# Patient Record
Sex: FEMALE | Race: OTHER | HISPANIC OR LATINO | URBAN - METROPOLITAN AREA
[De-identification: names, ages, dates, MRNs, and addresses within clinical notes are randomized per-mention and may not be internally consistent; named-entity substitution may affect disease eponyms.]

---

## 2023-02-06 ENCOUNTER — EMERGENCY (EMERGENCY)
Facility: HOSPITAL | Age: 38
LOS: 0 days | Discharge: HOME | End: 2023-02-06
Attending: EMERGENCY MEDICINE
Payer: MEDICAID

## 2023-02-06 VITALS
RESPIRATION RATE: 18 BRPM | OXYGEN SATURATION: 99 % | DIASTOLIC BLOOD PRESSURE: 84 MMHG | SYSTOLIC BLOOD PRESSURE: 132 MMHG | TEMPERATURE: 97 F | WEIGHT: 149.91 LBS | HEART RATE: 98 BPM

## 2023-02-06 DIAGNOSIS — R09.81 NASAL CONGESTION: ICD-10-CM

## 2023-02-06 DIAGNOSIS — R50.9 FEVER, UNSPECIFIED: ICD-10-CM

## 2023-02-06 DIAGNOSIS — R51.9 HEADACHE, UNSPECIFIED: ICD-10-CM

## 2023-02-06 DIAGNOSIS — J32.9 CHRONIC SINUSITIS, UNSPECIFIED: ICD-10-CM

## 2023-02-06 PROCEDURE — 99283 EMERGENCY DEPT VISIT LOW MDM: CPT

## 2023-02-06 PROCEDURE — 99284 EMERGENCY DEPT VISIT MOD MDM: CPT

## 2023-02-06 RX ORDER — AZITHROMYCIN 500 MG/1
1 TABLET, FILM COATED ORAL
Qty: 1 | Refills: 0
Start: 2023-02-06 | End: 2023-02-10

## 2023-02-06 NOTE — ED PROVIDER NOTE - WET READ LAUNCH FT
.It is important to get enough sleep (at least 7 hrs a night).   Increase EXERCISE to 30 -40 minutes daily with sweating, eat a healthy diet (5 fruits and/or vegetables a day) decrease carbohydrates, stay hydrated,  take a multivitamin, take fish/krill oil There are no Wet Read(s) to document.

## 2023-02-06 NOTE — ED PROVIDER NOTE - OBJECTIVE STATEMENT
2-week history of nasal congestion facial pain and intermittent fevers.  A month ago patient had some URI symptoms.  Has not improved since then.  She has been using ibuprofen and nasal spray without any improvement.

## 2023-02-06 NOTE — ED PROVIDER NOTE - PHYSICAL EXAMINATION
Physical Exam    Vital Signs: I have reviewed the initial vital signs.  Constitutional: well-nourished, appears stated age, no acute distress  Eyes: Conjunctiva pink, Sclera clear, PERRLA, EOMI.  Nose: + sinus tenderness to maxillary area   Cardiovascular: S1 and S2, regular rate, regular rhythm, well-perfused extremities, radial pulses equal and 2+  Respiratory: unlabored respiratory effort, clear to auscultation bilaterally no wheezing, rales and rhonchi  Gastrointestinal: soft, non-tender abdomen, no pulsatile mass, normal bowl sounds  Musculoskeletal: supple neck, no lower extremity edema, no midline tenderness  Integumentary: warm, dry, no rash  Neurologic: awake, alert, cranial nerves II-XII grossly intact, extremities’ motor and sensory functions grossly intact  Psychiatric: appropriate mood, appropriate affect

## 2023-02-06 NOTE — ED PROVIDER NOTE - ATTENDING APP SHARED VISIT CONTRIBUTION OF CARE
2-week history of nasal congestion facial pain and intermittent fevers.  A month ago patient had some URI symptoms.  Has not improved since then.  She has been using ibuprofen and nasal spray without any improvement. Exam shows oropharynx is normal lungs are clear bilateral nasal swelling with turbinates enlarged.  There is some facial tenderness over the sinuses.  Plan will be to treat a sinusitis given prolonged symptoms without any improvement with conservative treatment.

## 2023-02-06 NOTE — ED PROVIDER NOTE - NSFOLLOWUPCLINICS_GEN_ALL_ED_FT
[facility]  Medicine  [address], [place]   Phone: [phone]  Fax:   Follow Up Time: 1-3 Days

## 2023-02-06 NOTE — ED PROVIDER NOTE - NS ED ATTENDING STATEMENT MOD
This was a shared visit with the KEESHA. I reviewed and verified the documentation and independently performed the documented:

## 2023-02-06 NOTE — ED PROVIDER NOTE - NSFOLLOWUPINSTRUCTIONS_ED_ALL_ED_FT
Seguimiento con christian médico de atención primaria en 1-2 días    Sinusitis    LO QUE NECESITAS SABER:    La sinusitis es la inflamación o infección de los senos paranasales. La mayoría de las veces es causada por un virus. La sinusitis aguda puede durar hasta 12 semanas. La sinusitis crónica dura más de 12 semanas. La sinusitis recurrente significa que tiene 4 o más veces en 1 año. senos paranasales         INSTRUCCIONES DE DESCARGA:    Regrese al departamento de emergencias si:    Christian richie y párpado están rojos, hinchados y dolorosos.      No puedes abrir el richie.      Tiene cambios en la visión, fanny visión doble.      Christian globo ocular sobresale o no puede  el richie.      Tiene más sueño de lo normal o nota cambios en christian capacidad para pensar, moverse o hablar.      Tiene rigidez en el keaton, fiebre o un radha dolor de jessica.      Tiene hinchazón en la frente o el cuero cabelludo.    Comuníquese con christian proveedor de atención médica si:    Gala síntomas no mejoran después de 3 días.      Gala síntomas no desaparecen después de 10 días.      Tiene náuseas y está vomitando.      Tu nariz está sangrando.      Tiene preguntas o inquietudes sobre christian condición o atención.    Medicamentos: Gala síntomas pueden desaparecer por sí solos. Christian proveedor de atención médica puede recomendar sheron espera vigilante de hasta 10 días antes de comenzar con los antibióticos. Es posible que necesite cualquiera de los siguientes:    El acetaminofén disminuye el dolor y la fiebre. Está disponible sin receta médica. Pregunte cuánto carolynn y con qué frecuencia. Seguir direcciones. Radha las etiquetas de todos los demás medicamentos que esté usando para gianni si también contienen acetaminofeno, o consulte a christian médico o farmacéutico. El acetaminofén puede causar daño hepático si no se lucia correctamente. No use más de 4 gramos (4000 miligramos) en total de acetaminofeno en un día.      Los ESTRADA, fanny el ibuprofeno, ayudan a disminuir la hinchazón, el dolor y la fiebre. Jessy medicamento está disponible con o sin receta médica. Los ESTRADA pueden causar sangrado estomacal o problemas renales en ciertas personas. Si lucia medicamentos anticoagulantes, siempre pregunte a christian proveedor de atención médica si los ESTRADA son seguros para usted. Siempre radha la etiqueta del medicamento y siga las instrucciones.      Los aerosoles nasales con esteroides pueden ayudar a disminuir la inflamación en la nariz y los senos paranasales.      Los descongestionantes ayudan a reducir la hinchazón y drenar la mucosidad en la nariz y los senos paranasales. Pueden ayudarlo a respirar más fácilmente.      Los antihistamínicos ayudan a secar la mucosidad de la nariz y alivian los estornudos.      Los antibióticos ayudan a tratar o prevenir sheron infección bacteriana.      Burgin christian medicamento según las indicaciones. Comuníquese con christian proveedor de atención médica si nicolasa que christian medicamento no está ayudando o si tiene efectos secundarios. Infórmele si es alérgico a algún medicamento. Mantenga sheron lista de los medicamentos, vitaminas y hierbas que lucia. Incluya las cantidades, y cuándo y por qué las lucia. Lleve la lista o los frascos de pastillas a las visitas de seguimiento. Lleve consigo christian lista de medicamentos en iglesia de sheron emergencia.    Cuidados personales:    Enjuague gala senos paranasales. Use un dispositivo de enjuague nasal para enjuagar glaa fosas nasales con sheron solución salina (agua salada) o agua destilada. No use agua del grifo. [place] ayudará a diluir la mucosidad en la nariz y enjuagará el polen y la suciedad. También ayudará a reducir la hinchazón para que pueda respirar normalmente. Pregúntele a christian proveedor de atención médica con qué frecuencia debe hacer esto.      Inhala vapor. Calienta un recipiente con agua hasta que veas vapor. Inclínese sobre el recipiente y breanne sheron irvin de campaña sobre christian jessica con sheron toalla lindsay. Respira profundamente shweta unos 20 minutos. Tenga cuidado de no acercarse demasiado al vapor o quemarse. Breanne esto 3 veces al día. También puede respirar profundamente cuando lucia sheron ducha caliente.      Duerme con la jessica elevada. Coloque sheron almohada adicional debajo de christian jessica antes de irse a dormir para ayudar a que gala senos paranasales se drenen.      Anna líquidos según las indicaciones. Pregúntele a christian proveedor de atención médica cuánto líquido debe beber cada día y qué líquidos son mejores para usted. Los líquidos diluirán la mucosidad de la nariz y la ayudarán a drenar. Evite las bebidas que contengan alcohol o cafeína.      No fume y evite el humo de segunda mano. La nicotina y otras sustancias químicas en los cigarrillos y puros pueden empeorar gala síntomas. Pida información a christian proveedor de atención médica si actualmente fuma y necesita ayuda para dejar de fumar. Los cigarrillos electrónicos o el tabaco sin humo todavía contienen nicotina. Hable con christian proveedor de atención médica antes de usar estos productos.    Prevenga la propagación de gérmenes que causan la sinusitis: Lávese las pat con frecuencia con agua y jabón. Lávese las pat después de usar el baño, cambiar el pañal de un soraya o estornudar. Lávese las pat antes de preparar o comer alimentos. Lavarse las pat         Breanne un seguimiento con christian proveedor de atención médica según las indicaciones: Es posible que lo deriven a un especialista en oído, nariz y garganta. Anote gala preguntas para que recuerde hacerlas shweta gala visitas.       © Copyright [employer] 2019 Todas las ilustraciones e imágenes incluidas en [employer]Notes son propiedad intelectual de [employer] o [employer].    Follow up with your primary care doctor in 1-2 days     Sinusitis    WHAT YOU NEED TO KNOW:    Sinusitis is inflammation or infection of your sinuses. It is most often caused by a virus. Acute sinusitis may last up to 12 weeks. Chronic sinusitis lasts longer than 12 weeks. Recurrent sinusitis means you have 4 or more times in 1 year. Sinuses         DISCHARGE INSTRUCTIONS:    Return to the emergency department if:     Your eye and eyelid are red, swollen, and painful.       You cannot open your eye.       You have vision changes, such as double vision.      Your eyeball bulges out or you cannot move your eye.       You are more sleepy than normal, or you notice changes in your ability to think, move, or talk.      You have a stiff neck, a fever, or a bad headache.       You have swelling of your forehead or scalp.    Contact your healthcare provider if:     Your symptoms do not improve after 3 days.      Your symptoms do not go away after 10 days.      You have nausea and are vomiting.      Your nose is bleeding.      You have questions or concerns about your condition or care.    Medicines: Your symptoms may go away on their own. Your healthcare provider may recommend watchful waiting for up to 10 days before starting antibiotics. You may need any of the following:     Acetaminophen decreases pain and fever. It is available without a doctor's order. Ask how much to take and how often to take it. Follow directions. Read the labels of all other medicines you are using to see if they also contain acetaminophen, or ask your doctor or pharmacist. Acetaminophen can cause liver damage if not taken correctly. Do not use more than 4 grams (4,000 milligrams) total of acetaminophen in one day.       NSAIDs, such as ibuprofen, help decrease swelling, pain, and fever. This medicine is available with or without a doctor's order. NSAIDs can cause stomach bleeding or kidney problems in certain people. If you take blood thinner medicine, always ask your healthcare provider if NSAIDs are safe for you. Always read the medicine label and follow directions.      Nasal steroid sprays may help decrease inflammation in your nose and sinuses.      Decongestants help reduce swelling and drain mucus in the nose and sinuses. They may help you breathe easier.       Antihistamines help dry mucus in the nose and relieve sneezing.       Antibiotics help treat or prevent a bacterial infection.      Take your medicine as directed. Contact your healthcare provider if you think your medicine is not helping or if you have side effects. Tell him or her if you are allergic to any medicine. Keep a list of the medicines, vitamins, and herbs you take. Include the amounts, and when and why you take them. Bring the list or the pill bottles to follow-up visits. Carry your medicine list with you in case of an emergency.    Self-care:     Rinse your sinuses. Use a sinus rinse device to rinse your nasal passages with a saline (salt water) solution or distilled water. Do not use tap water. This will help thin the mucus in your nose and rinse away pollen and dirt. It will also help reduce swelling so you can breathe normally. Ask your healthcare provider how often to do this.       Breathe in steam. Heat a bowl of water until you see steam. Lean over the bowl and make a tent over your head with a large towel. Breathe deeply for about 20 minutes. Be careful not to get too close to the steam or burn yourself. Do this 3 times a day. You can also breathe deeply when you take a hot shower.       Sleep with your head elevated. Place an extra pillow under your head before you go to sleep to help your sinuses drain.       Drink liquids as directed. Ask your healthcare provider how much liquid to drink each day and which liquids are best for you. Liquids will thin the mucus in your nose and help it drain. Avoid drinks that contain alcohol or caffeine.       Do not smoke, and avoid secondhand smoke. Nicotine and other chemicals in cigarettes and cigars can make your symptoms worse. Ask your healthcare provider for information if you currently smoke and need help to quit. E-cigarettes or smokeless tobacco still contain nicotine. Talk to your healthcare provider before you use these products.     Prevent the spread of germs that cause sinusitis: Wash your hands often with soap and water. Wash your hands after you use the bathroom, change a child's diaper, or sneeze. Wash your hands before you prepare or eat food. Handwashing         Follow up with your healthcare provider as directed: You may be referred to an ear, nose, and throat specialist. Write down your questions so you remember to ask them during your visits.        © Copyright [employer] 2019 All illustrations and images included in CareNotes are the copyrighted property of [employer] or [employer].

## 2023-04-01 PROBLEM — Z78.9 OTHER SPECIFIED HEALTH STATUS: Chronic | Status: ACTIVE | Noted: 2023-02-06

## 2023-05-12 ENCOUNTER — APPOINTMENT (OUTPATIENT)
Dept: NEUROLOGY | Facility: CLINIC | Age: 38
End: 2023-05-12

## 2023-05-12 PROBLEM — Z00.00 ENCOUNTER FOR PREVENTIVE HEALTH EXAMINATION: Status: ACTIVE | Noted: 2023-05-12
